# Patient Record
Sex: FEMALE | Race: WHITE | NOT HISPANIC OR LATINO | Employment: FULL TIME | URBAN - METROPOLITAN AREA
[De-identification: names, ages, dates, MRNs, and addresses within clinical notes are randomized per-mention and may not be internally consistent; named-entity substitution may affect disease eponyms.]

---

## 2019-05-24 ENCOUNTER — OFFICE VISIT (OUTPATIENT)
Dept: URGENT CARE | Facility: CLINIC | Age: 59
End: 2019-05-24
Payer: COMMERCIAL

## 2019-05-24 VITALS
HEIGHT: 63 IN | TEMPERATURE: 96.8 F | SYSTOLIC BLOOD PRESSURE: 143 MMHG | OXYGEN SATURATION: 93 % | DIASTOLIC BLOOD PRESSURE: 91 MMHG | HEART RATE: 68 BPM | WEIGHT: 237 LBS | RESPIRATION RATE: 18 BRPM | BODY MASS INDEX: 41.99 KG/M2

## 2019-05-24 DIAGNOSIS — H65.92 LEFT NON-SUPPURATIVE OTITIS MEDIA: Primary | ICD-10-CM

## 2019-05-24 PROCEDURE — 99213 OFFICE O/P EST LOW 20 MIN: CPT | Performed by: PHYSICIAN ASSISTANT

## 2019-05-24 RX ORDER — MELOXICAM 7.5 MG/1
7.5 TABLET ORAL DAILY
Refills: 3 | COMMUNITY
Start: 2019-03-27

## 2019-05-24 RX ORDER — ALPRAZOLAM 0.5 MG/1
TABLET ORAL
Refills: 2 | COMMUNITY
Start: 2019-03-13

## 2019-05-24 RX ORDER — CEFDINIR 300 MG/1
300 CAPSULE ORAL EVERY 12 HOURS SCHEDULED
Qty: 14 CAPSULE | Refills: 0 | Status: SHIPPED | OUTPATIENT
Start: 2019-05-24 | End: 2019-05-31

## 2019-05-24 RX ORDER — AZELASTINE HYDROCHLORIDE AND FLUTICASONE PROPIONATE 137; 50 UG/1; UG/1
SPRAY, METERED NASAL
Refills: 1 | COMMUNITY
Start: 2019-04-24

## 2019-05-24 RX ORDER — VALSARTAN AND HYDROCHLOROTHIAZIDE 320; 25 MG/1; MG/1
1 TABLET, FILM COATED ORAL DAILY
Refills: 5 | COMMUNITY
Start: 2019-05-08

## 2019-05-24 RX ORDER — MONTELUKAST SODIUM 10 MG/1
TABLET ORAL
COMMUNITY
Start: 2019-05-17

## 2021-10-26 ENCOUNTER — OCCMED (OUTPATIENT)
Dept: URGENT CARE | Facility: CLINIC | Age: 61
End: 2021-10-26

## 2021-10-26 ENCOUNTER — APPOINTMENT (OUTPATIENT)
Dept: LAB | Facility: CLINIC | Age: 61
End: 2021-10-26

## 2021-10-26 DIAGNOSIS — Z02.1 PRE-EMPLOYMENT EXAMINATION: Primary | ICD-10-CM

## 2021-10-26 DIAGNOSIS — Z02.1 PRE-EMPLOYMENT HEALTH SCREENING EXAMINATION: ICD-10-CM

## 2021-10-26 LAB — RUBV IGG SERPL IA-ACNC: >175 IU/ML

## 2021-10-26 PROCEDURE — 36415 COLL VENOUS BLD VENIPUNCTURE: CPT

## 2021-10-26 PROCEDURE — 86735 MUMPS ANTIBODY: CPT

## 2021-10-26 PROCEDURE — 86787 VARICELLA-ZOSTER ANTIBODY: CPT

## 2021-10-26 PROCEDURE — 86480 TB TEST CELL IMMUN MEASURE: CPT

## 2021-10-26 PROCEDURE — 86765 RUBEOLA ANTIBODY: CPT

## 2021-10-26 PROCEDURE — 86762 RUBELLA ANTIBODY: CPT

## 2021-10-27 LAB
GAMMA INTERFERON BACKGROUND BLD IA-ACNC: 0.03 IU/ML
M TB IFN-G BLD-IMP: NEGATIVE
M TB IFN-G CD4+ BCKGRND COR BLD-ACNC: 0 IU/ML
M TB IFN-G CD4+ BCKGRND COR BLD-ACNC: 0.01 IU/ML
MITOGEN IGNF BCKGRD COR BLD-ACNC: >10 IU/ML

## 2021-10-28 LAB — VZV IGG SER IA-ACNC: NORMAL

## 2021-10-29 LAB
MEV IGG SER QL: NORMAL
MUV IGG SER QL: NORMAL

## 2025-07-09 ENCOUNTER — EVALUATION (OUTPATIENT)
Dept: PHYSICAL THERAPY | Facility: CLINIC | Age: 65
End: 2025-07-09
Payer: COMMERCIAL

## 2025-07-09 DIAGNOSIS — S46.011A TRAUMATIC INCOMPLETE TEAR OF RIGHT ROTATOR CUFF, INITIAL ENCOUNTER: Primary | ICD-10-CM

## 2025-07-09 PROCEDURE — 97161 PT EVAL LOW COMPLEX 20 MIN: CPT | Performed by: PHYSICAL THERAPIST

## 2025-07-09 PROCEDURE — 97110 THERAPEUTIC EXERCISES: CPT | Performed by: PHYSICAL THERAPIST

## 2025-07-09 NOTE — PROGRESS NOTES
PT Evaluation     Today's date: 2025  Patient name: Leann Presley  : 1960  MRN: [id number]  Referring provider: Sean So, *  Dx:   Encounter Diagnosis     ICD-10-CM    1. Traumatic incomplete tear of right rotator cuff, initial encounter  S46.011A                      Assessment  Impairments: abnormal or restricted ROM, activity intolerance, impaired physical strength, lacks appropriate home exercise program and pain with function  Functional limitations: reaching; lifting  Symptom irritability: moderate    Assessment details: Pt is 66yo female presenting to therapy following acute shoulder pain consistent with partial RTC tear affecting mostly ER and flexion. Pt is showing difficulty with arom in flexion, abduction and ER as well as strength. Pt would benefit from PT services to improve arom and strength to return to PLOF.  Understanding of Dx/Px/POC: good     Prognosis: good    Goals  1. Pt will be independent with HEP upon discharge.  2. Pt will improve shoulder arom to WFL and painfree.  3. Pt will improve shoulder strength to 5/5 lift her arm to complete ADL's.  4. Pt will be able to complete yard work without increased pain.    Plan  Patient would benefit from: skilled physical therapy    Planned therapy interventions: functional ROM exercises, therapeutic activities, therapeutic exercise, therapeutic training, stretching, strengthening, home exercise program, neuromuscular re-education, manual therapy and patient education    Frequency: 2x week  Duration in weeks: 6  Treatment plan discussed with: patient      Subjective Evaluation    History of Present Illness  Mechanism of injury: Pt reports with Rt shoulder pain started 2 weeks ago lifting a bag of grass about 50#, she heard a pop. Pain and difficulty lifting her arm. Rt handed. Pt had MRI that shows partial RTC tear of supraspinatus and infraspinatus. Pt did a medrol dose pack that helped for 2 days. Pt was told to try PT for 2  weeks and then she has an appt with the surgeon next Friday. 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  Quality of life: good    Patient Goals  Patient goals for therapy: decreased pain, increased strength and independence with ADLs/IADLs    Pain  Current pain ratin  At worst pain rating: 10  Location: anterior/lateral shoulder  Quality: sharp  Relieving factors: ice, medications and rest  Aggravating factors: lifting and overhead activity  Progression: improved        Objective     Passive Range of Motion   Left Shoulder   Normal passive range of motion    Right Shoulder   Flexion: 170 degrees   Abduction: 140 degrees with pain  External rotation 90°: 95 degrees   Internal rotation 90°: 50 degrees with pain    Strength/Myotome Testing     Left Shoulder   Normal muscle strength    Right Shoulder     Planes of Motion   Flexion: 4   Abduction: 4   External rotation at 90°: 4-   Internal rotation at 90°: 5     Tests     Right Shoulder   Positive Hawkin's and painful arc.   Negative drop arm and Neer's.       Flowsheet Rows      Flowsheet Row Most Recent Value   PT/OT G-Codes    Current Score 36   Projected Score 62               Precautions: none      Manuals                                                                 Neuro Re-Ed             Rows             Sh Ext             Hor ABD                                                                 Ther Ex             Wall slides (flex,abd) 10xea            Sh Isometrics (flex,abd, er) 5x10''ea            UBE             Pulleys             Shoulder flexion w/ wand             Shoulder ER tband             Ball on wall rot                          Ther Activity                                       Gait Training                                       Modalities

## 2025-07-09 NOTE — LETTER
2025    Sean So DO  [address] [place] [address]    Patient: Leann Presley   YOB: 1960   Date of Visit: 2025     Encounter Diagnosis     ICD-10-CM    1. Traumatic incomplete tear of right rotator cuff, initial encounter  S46.011A           Dear Dr. Sean So, DO:    Thank you for your recent referral of Leann Presley. Please review the attached evaluation summary from Leann's recent visit.     Please verify that you agree with the plan of care by signing the attached order.     If you have any questions or concerns, please do not hesitate to call.     I sincerely appreciate the opportunity to share in the care of one of your patients and hope to have another opportunity to work with you in the near future.       Sincerely,    Bindu Chambers, PT      Referring Provider:      I certify that I have read the below Plan of Care and certify the need for these services furnished under this plan of treatment while under my care.                    Sean So DO  [address] [place] [address]  Via Fax: [phone]          PT Evaluation     Today's date: 2025  Patient name: Leann Presley  : 1960  MRN: [id number]  Referring provider: Sean So, *  Dx:   Encounter Diagnosis     ICD-10-CM    1. Traumatic incomplete tear of right rotator cuff, initial encounter  S46.011A                      Assessment  Impairments: abnormal or restricted ROM, activity intolerance, impaired physical strength, lacks appropriate home exercise program and pain with function  Functional limitations: reaching; lifting  Symptom irritability: moderate    Assessment details: Pt is 64yo female presenting to therapy following acute shoulder pain consistent with partial RTC tear affecting mostly ER and flexion. Pt is showing difficulty with arom in flexion, abduction and ER as well as strength. Pt would benefit from PT services to improve  arom and strength to return to PLOF.  Understanding of Dx/Px/POC: good     Prognosis: good    Goals  1. Pt will be independent with HEP upon discharge.  2. Pt will improve shoulder arom to WFL and painfree.  3. Pt will improve shoulder strength to 5/5 lift her arm to complete ADL's.  4. Pt will be able to complete yard work without increased pain.    Plan  Patient would benefit from: skilled physical therapy    Planned therapy interventions: functional ROM exercises, therapeutic activities, therapeutic exercise, therapeutic training, stretching, strengthening, home exercise program, neuromuscular re-education, manual therapy and patient education    Frequency: 2x week  Duration in weeks: 6  Treatment plan discussed with: patient      Subjective Evaluation    History of Present Illness  Mechanism of injury: Pt reports with Rt shoulder pain started 2 weeks ago lifting a bag of grass about 50#, she heard a pop. Pain and difficulty lifting her arm. Rt handed. Pt had MRI that shows partial RTC tear of supraspinatus and infraspinatus. Pt did a medrol dose pack that helped for 2 days. Pt was told to try PT for 2 weeks and then she has an appt with the surgeon next Friday. 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  Quality of life: good    Patient Goals  Patient goals for therapy: decreased pain, increased strength and independence with ADLs/IADLs    Pain  Current pain ratin  At worst pain rating: 10  Location: anterior/lateral shoulder  Quality: sharp  Relieving factors: ice, medications and rest  Aggravating factors: lifting and overhead activity  Progression: improved        Objective     Passive Range of Motion   Left Shoulder   Normal passive range of motion    Right Shoulder   Flexion: 170 degrees   Abduction: 140 degrees with pain  External rotation 90°: 95 degrees   Internal rotation 90°: 50 degrees with pain    Strength/Myotome Testing     Left Shoulder   Normal muscle  strength    Right Shoulder     Planes of Motion   Flexion: 4   Abduction: 4   External rotation at 90°: 4-   Internal rotation at 90°: 5     Tests     Right Shoulder   Positive Hawkin's and painful arc.   Negative drop arm and Neer's.       Flowsheet Rows      Flowsheet Row Most Recent Value   PT/OT G-Codes    Current Score 36   Projected Score 62               Precautions: none      Manuals 7/9                                                                Neuro Re-Ed             Rows             Sh Ext             Hor ABD                                                                 Ther Ex             Wall slides (flex,abd) 10xea            Sh Isometrics (flex,abd, er) 5x10''ea            UBE             Pulleys             Shoulder flexion w/ wand             Shoulder ER tband             Ball on wall rot                          Ther Activity                                       Gait Training                                       Modalities

## 2025-07-09 NOTE — HOME EXERCISE EDUCATION
Program_ID:[id number]   Access Code: [email or web address]  URL: [email or web address]  Date: 07-  Prepared By: Bindu Chambers    Program Notes      Exercises      - Shoulder Flexion Wall Slide with Towel - 2 x daily - 7 x weekly - 3 sets - 10 reps      - Standing Shoulder Abduction Slides at Wall - 2 x daily - 7 x weekly - 3 sets - 10 reps      - Standing Isometric Shoulder External Rotation with Doorway - 2 x daily - 7 x weekly - 1 sets - 10 reps - 10sec hold      - Isometric Shoulder Abduction at Wall - 2 x daily - 7 x weekly - 1 sets - 10 reps - 10sec hold      - Isometric Shoulder Flexion at Wall - 2 x daily - 7 x weekly - 1 sets - 10 reps - 10sec hold

## 2025-07-14 ENCOUNTER — OFFICE VISIT (OUTPATIENT)
Dept: PHYSICAL THERAPY | Facility: CLINIC | Age: 65
End: 2025-07-14
Payer: COMMERCIAL

## 2025-07-14 DIAGNOSIS — S46.011A TRAUMATIC INCOMPLETE TEAR OF RIGHT ROTATOR CUFF, INITIAL ENCOUNTER: Primary | ICD-10-CM

## 2025-07-14 PROCEDURE — 97110 THERAPEUTIC EXERCISES: CPT | Performed by: PHYSICAL THERAPIST

## 2025-07-14 NOTE — PROGRESS NOTES
Daily Note     Today's date: 2025  Patient name: Leann Presley  : 1960  MRN: [id number]  Referring provider: Sean So, *  Dx:   Encounter Diagnosis     ICD-10-CM    1. Traumatic incomplete tear of right rotator cuff, initial encounter  S46.011A                      Subjective: Pt reports doing okay. Exercises aren't too bad. She notes tripping and using that arm to catch herself and had severe pain.      Objective: See treatment diary below      Assessment: Pt tolerating exercises well, aarom exercises are showing good improvement. Arom with front and lateral raises were still very challenging and showing compensations. Educated to add more eccentric movements for HEP. Continue to progress as able.      Plan: Continue per plan of care.      Precautions: none      Manuals                                                                Neuro Re-Ed             Rows  3x10 black           Sh Ext  3x10 black           Hor ABD  2x10 red                                                               Ther Ex             Wall slides (flex,abd) 10xea 30xea           Sh Isometrics (flex,abd, er) 5x10''ea Flex 15x10''           UBE  4'/4' L2           Pulleys  30x           Shoulder flexion w/ wand  Ecc 20x           Shoulder ER tband  Green 3x10            Ball on wall rot             Front raise/ Lat raise  10xea           Ther Activity                                       Gait Training                                       Modalities

## 2025-07-16 ENCOUNTER — OFFICE VISIT (OUTPATIENT)
Dept: PHYSICAL THERAPY | Facility: CLINIC | Age: 65
End: 2025-07-16
Payer: COMMERCIAL

## 2025-07-16 DIAGNOSIS — S46.011A TRAUMATIC INCOMPLETE TEAR OF RIGHT ROTATOR CUFF, INITIAL ENCOUNTER: Primary | ICD-10-CM

## 2025-07-16 PROCEDURE — 97110 THERAPEUTIC EXERCISES: CPT | Performed by: PHYSICAL THERAPIST

## 2025-07-16 NOTE — PROGRESS NOTES
Daily Note     Today's date: 2025  Patient name: Leann Presley  : 1960  MRN: [id number]  Referring provider: Sean So, *  Dx:   Encounter Diagnosis     ICD-10-CM    1. Traumatic incomplete tear of right rotator cuff, initial encounter  S46.011A                      Subjective: Pt reports the cane exercise is really helping her but does crack and hurt until rep 18. Her arom is improving though.      Objective: See treatment diary below      Assessment: Pt tolerating aarom very well. Showing really good improvements in both aarom with flexion wall slides as well as arom flexion. Educated to continue her same exercises. Laser added for possible cracking and soreness post session. Will continue to progress as able.       Plan: Continue per plan of care.      Precautions: none      Manuals           Laser   16W 4'                                                 Neuro Re-Ed             Rows  3x10 black 3x10 black          Sh Ext  3x10 black 3x10 black          Hor ABD  2x10 red 2x10 green                                                              Ther Ex             Wall slides (flex,abd) 10xea 30xea 30x flex ecc          Sh Isometrics (flex,abd, er) 5x10''ea Flex 15x10'' Flex walk outs dbl red 15x          UBE  4'/4' L2 4'/4' L2          Pulleys  30x 30x          Shoulder flexion w/ wand  Ecc 20x Ecc 20x 3#          Shoulder ER tband  Green 3x10  3x10 blue          Cone to shelf   15-20x          Front raise/ Lat raise  10xea Lat 30x          Ther Activity                                       Gait Training                                       Modalities

## 2025-07-21 ENCOUNTER — OFFICE VISIT (OUTPATIENT)
Dept: PHYSICAL THERAPY | Facility: CLINIC | Age: 65
End: 2025-07-21
Payer: COMMERCIAL

## 2025-07-21 DIAGNOSIS — S46.011A TRAUMATIC INCOMPLETE TEAR OF RIGHT ROTATOR CUFF, INITIAL ENCOUNTER: Primary | ICD-10-CM

## 2025-07-21 PROCEDURE — 97110 THERAPEUTIC EXERCISES: CPT | Performed by: PHYSICAL THERAPIST

## 2025-07-21 NOTE — PROGRESS NOTES
Daily Note     Today's date: 2025  Patient name: Leann Presley  : 1960  MRN: [id number]  Referring provider: Sean So, *  Dx:   Encounter Diagnosis     ICD-10-CM    1. Traumatic incomplete tear of right rotator cuff, initial encounter  S46.011A                      Subjective: Pt reports laser helping with her shoulder after last visit. Still some difficulty with lifting her arm.      Objective: See treatment diary below      Assessment: Pt is continuing to show improvement with exercises. Progressed with small weights. Continued with laser. Will continue to progress as able.      Plan: Continue per plan of care.      Precautions: none      Manuals          Laser   16W 4' 16W 4'                                                Neuro Re-Ed             Rows  3x10 black 3x10 black 3x10 black         Sh Ext  3x10 black 3x10 black 3x10 black         Hor ABD  2x10 red 2x10 green 3x10 green                                                             Ther Ex             Wall slides (flex,abd) 10xea 30xea 30x flex ecc 30x flex ecc         Sh Isometrics (flex,abd, er) 5x10''ea Flex 15x10'' Flex walk outs dbl red 15x Flex walk outs dbl red 20x         UBE  4'/4' L2 4'/4' L2 4'/4' L2         Pulleys  30x 30x 30x         Shoulder flexion w/ wand  Ecc 20x Ecc 20x 3# Ecc 20x 3#         Shoulder ER tband  Green 3x10  3x10 blue 3x10 blue         Cone to shelf   15-20x 20x         Front raise/ Lat raise  10xea Lat 30x Lat 30x 1# for 15x         Ther Activity                                       Gait Training                                       Modalities

## 2025-07-23 ENCOUNTER — APPOINTMENT (OUTPATIENT)
Dept: PHYSICAL THERAPY | Facility: CLINIC | Age: 65
End: 2025-07-23
Payer: COMMERCIAL

## 2025-07-24 ENCOUNTER — OFFICE VISIT (OUTPATIENT)
Dept: PHYSICAL THERAPY | Facility: CLINIC | Age: 65
End: 2025-07-24
Payer: COMMERCIAL

## 2025-07-24 DIAGNOSIS — S46.011A TRAUMATIC INCOMPLETE TEAR OF RIGHT ROTATOR CUFF, INITIAL ENCOUNTER: Primary | ICD-10-CM

## 2025-07-24 PROCEDURE — 97110 THERAPEUTIC EXERCISES: CPT | Performed by: PHYSICAL THERAPIST

## 2025-07-24 NOTE — LETTER
2025    Sean So DO  [address] [place] [address]    Patient: Leann Presley   YOB: 1960   Date of Visit: 2025     Encounter Diagnosis     ICD-10-CM    1. Traumatic incomplete tear of right rotator cuff, initial encounter  S46.011A           Dear Dr. Sean So, DO:    Thank you for your recent referral of Leann Presley. Please review the attached evaluation summary from Leann's recent visit.     Please verify that you agree with the plan of care by signing the attached order.     If you have any questions or concerns, please do not hesitate to call.     I sincerely appreciate the opportunity to share in the care of one of your patients and hope to have another opportunity to work with you in the near future.       Sincerely,    Bindu Chambers, PT      Referring Provider:      I certify that I have read the below Plan of Care and certify the need for these services furnished under this plan of treatment while under my care.                    Sean So DO  [address] [place] [address]  Via Fax: [phone]          Daily Note     Today's date: 2025  Patient name: Leann Presley  : 1960  MRN: [id number]  Referring provider: Sean So, *  Dx:   Encounter Diagnosis     ICD-10-CM    1. Traumatic incomplete tear of right rotator cuff, initial encounter  S46.011A                      Subjective: Pt reports soreness after last session. She tried yoga this morning which some overhead arm movements were difficult.      Objective: See treatment diary below      Right Shoulder   Flexion: 170 degrees   Abduction: 160 degrees   External rotation 90°: 95 degrees   Internal rotation 90°: 82 degrees     Right Shoulder     Planes of Motion   Flexion: 4 (pain)  Abduction: 4+ (slight pain)  External rotation at 90°: 4+ (slight pain)  Internal rotation at 90°: 5     Assessment: Pt is showing good improvement with prom  and arom and strength. Pt is still limited with some pain with arom as well as strength. Pt would continue to benefit from PT services for further strengthening. 2xweek for 4 more weeks.      Plan: Continue per plan of care.      Precautions: none      Manuals 7/9 7/14 7/16 7/21 7/24        Laser   16W 4' 16W 4'                                                Neuro Re-Ed             Rows  3x10 black 3x10 black 3x10 black 3x10 black        Sh Ext  3x10 black 3x10 black 3x10 black 3x10 black        Hor ABD  2x10 red 2x10 green 3x10 green 3x10 green                                                            Ther Ex             Wall slides (flex,abd) 10xea 30xea 30x flex ecc 30x flex ecc 30x flex ecc 2#        Sh Isometrics (flex,abd, er) 5x10''ea Flex 15x10'' Flex walk outs dbl red 15x Flex walk outs dbl red 20x Flex walk outs dbl green 20x        UBE  4'/4' L2 4'/4' L2 4'/4' L2 4'/4' L3        Pulleys  30x 30x 30x D/c        Shoulder flexion w/ wand  Ecc 20x Ecc 20x 3# Ecc 20x 3# Ecc 30x 3#        Shoulder ER tband  Green 3x10  3x10 blue 3x10 blue 3x10 blue        Cone to shelf   15-20x 20x         Front raise/ Lat raise  10xea Lat 30x Lat 30x 1# for 15x Lat 30x 1# for 20x        Ther Activity                                       Gait Training                                       Modalities

## 2025-07-24 NOTE — PROGRESS NOTES
Daily Note     Today's date: 2025  Patient name: Leann Presely  : 1960  MRN: [id number]  Referring provider: Sean So, *  Dx:   Encounter Diagnosis     ICD-10-CM    1. Traumatic incomplete tear of right rotator cuff, initial encounter  S46.011A                      Subjective: Pt reports soreness after last session. She tried yoga this morning which some overhead arm movements were difficult.      Objective: See treatment diary below      Right Shoulder   Flexion: 170 degrees   Abduction: 160 degrees   External rotation 90°: 95 degrees   Internal rotation 90°: 82 degrees     Right Shoulder     Planes of Motion   Flexion: 4 (pain)  Abduction: 4+ (slight pain)  External rotation at 90°: 4+ (slight pain)  Internal rotation at 90°: 5     Assessment: Pt is showing good improvement with prom and arom and strength. Pt is still limited with some pain with arom as well as strength. Pt would continue to benefit from PT services for further strengthening. 2xweek for 4 more weeks.      Plan: Continue per plan of care.      Precautions: none      Manuals         Laser   16W 4' 16W 4'                                                Neuro Re-Ed             Rows  3x10 black 3x10 black 3x10 black 3x10 black        Sh Ext  3x10 black 3x10 black 3x10 black 3x10 black        Hor ABD  2x10 red 2x10 green 3x10 green 3x10 green                                                            Ther Ex             Wall slides (flex,abd) 10xea 30xea 30x flex ecc 30x flex ecc 30x flex ecc 2#        Sh Isometrics (flex,abd, er) 5x10''ea Flex 15x10'' Flex walk outs dbl red 15x Flex walk outs dbl red 20x Flex walk outs dbl green 20x        UBE  4'/4' L2 4'/4' L2 4'/4' L2 4'/4' L3        Pulleys  30x 30x 30x D/c        Shoulder flexion w/ wand  Ecc 20x Ecc 20x 3# Ecc 20x 3# Ecc 30x 3#        Shoulder ER tband  Green 3x10  3x10 blue 3x10 blue 3x10 blue        Cone to shelf   15-20x 20x         Front raise/  Lat raise  10xea Lat 30x Lat 30x 1# for 15x Lat 30x 1# for 20x        Ther Activity                                       Gait Training                                       Modalities

## 2025-07-28 ENCOUNTER — OFFICE VISIT (OUTPATIENT)
Dept: PHYSICAL THERAPY | Facility: CLINIC | Age: 65
End: 2025-07-28
Payer: COMMERCIAL

## 2025-07-28 DIAGNOSIS — S46.011A TRAUMATIC INCOMPLETE TEAR OF RIGHT ROTATOR CUFF, INITIAL ENCOUNTER: Primary | ICD-10-CM

## 2025-07-29 ENCOUNTER — APPOINTMENT (OUTPATIENT)
Dept: PHYSICAL THERAPY | Facility: CLINIC | Age: 65
End: 2025-07-29
Payer: COMMERCIAL

## 2025-08-18 ENCOUNTER — EVALUATION (OUTPATIENT)
Dept: PHYSICAL THERAPY | Facility: CLINIC | Age: 65
End: 2025-08-18
Payer: COMMERCIAL

## 2025-08-18 DIAGNOSIS — Z98.890 S/P RIGHT ROTATOR CUFF REPAIR: ICD-10-CM

## 2025-08-18 DIAGNOSIS — S46.011A TRAUMATIC INCOMPLETE TEAR OF RIGHT ROTATOR CUFF, INITIAL ENCOUNTER: Primary | ICD-10-CM

## 2025-08-18 PROCEDURE — 97110 THERAPEUTIC EXERCISES: CPT | Performed by: PHYSICAL THERAPIST

## 2025-08-18 PROCEDURE — 97161 PT EVAL LOW COMPLEX 20 MIN: CPT | Performed by: PHYSICAL THERAPIST
